# Patient Record
Sex: FEMALE | Race: WHITE | Employment: FULL TIME | URBAN - METROPOLITAN AREA
[De-identification: names, ages, dates, MRNs, and addresses within clinical notes are randomized per-mention and may not be internally consistent; named-entity substitution may affect disease eponyms.]

---

## 2017-10-03 ENCOUNTER — HOSPITAL ENCOUNTER (OUTPATIENT)
Dept: GENERAL RADIOLOGY | Age: 37
Discharge: HOME | End: 2017-10-03
Attending: FAMILY MEDICINE
Payer: COMMERCIAL

## 2017-10-03 DIAGNOSIS — M25.532 LEFT WRIST PAIN: ICD-10-CM

## 2017-10-03 PROCEDURE — 73100 X-RAY EXAM OF WRIST: CPT

## 2017-10-30 ENCOUNTER — HOSPITAL ENCOUNTER (OUTPATIENT)
Dept: PHYSICAL THERAPY | Age: 37
End: 2017-10-30

## 2017-11-03 ENCOUNTER — HOSPITAL ENCOUNTER (OUTPATIENT)
Dept: PHYSICAL THERAPY | Age: 37
Discharge: HOME | End: 2017-11-03
Payer: COMMERCIAL

## 2017-11-03 PROCEDURE — 97530 THERAPEUTIC ACTIVITIES: CPT

## 2017-11-03 PROCEDURE — 97165 OT EVAL LOW COMPLEX 30 MIN: CPT

## 2017-11-03 NOTE — PROGRESS NOTES
Ambulatory/Rehab Services H2 Model Falls Risk Assessment    Risk Factor Pts. ·   Confusion/Disorientation/Impulsivity  []    4 ·   Symptomatic Depression  []   2 ·   Altered Elimination  []   1 ·   Dizziness/Vertigo  []   1 ·   Gender (Male)  []   1 ·   Any administered antiepileptics (anticonvulsants):  []   2 ·   Any administered benzodiazepines:  []   1 ·   Visual Impairment (specify):  []   1 ·   Portable Oxygen Use  []   1 ·   Orthostatic ? BP  []   1 ·   History of Recent Falls (within 3 mos.)  []   5     Ability to Rise from Chair (choose one) Pts. ·   Ability to rise in a single movement  []   0 ·   Pushes up, successful in one attempt  []   1 ·   Multiple attempts, but successful  []   3 ·   Unable to rise without assistance  []   4   Total: (5 or greater = High Risk) 0     Falls Prevention Plan:   []                Physical Limitations to Exercise (specify):   []                Mobility Assistance Device (type):   []                Exercise/Equipment Adaptation (specify):    ©2010 [facility] [address] States Patent #[id number].  Federal Law prohibits the replication, distribution or use without written permission from [facility] [employer]

## 2017-11-03 NOTE — THERAPY EVALUATION
Natalie Devi  : 1980  Payor: Sanya Greene / Plan: Bev Toscano RPN / Product Type: Commerical /  [address]  at [facility]  [address], [address], [place]  [phone]   [phone]         OUTPATIENT OCCUPATIONAL THERAPY: Initial Assessment 11/3/2017     ICD-10: Treatment Diagnosis: M25.532 M62.642 left wrist and thumb pain and numbness  Precautions/Allergies:   Augmentin [amoxicillin-pot clavulanate] causes stomach upset  Fall Risk Score: 0 (? 5 = High Risk)  MD Orders: Evaluate and treat MEDICAL/REFERRING DIAGNOSIS:   Neuralgia and neuritis, unspecified [M79.2]  And G56.02 left carpal tunnel syndrome  DATE OF ONSET: May 2017   REFERRING PHYSICIAN: Eulalia Teague MD  RETURN PHYSICIAN APPOINTMENT: 17     INITIAL ASSESSMENT:  Ms. Helane Gaucher presents with numbness and burning pain about the left thumb, volar wrist and occasionally in th and 5th digits, which are limiting her daily ADl's, work task and school online/lwork. Ms Helane Gaucher will benefit from skilled Occupational Therapy towards resolution and improved functional ADL's, per POC below. PLAN OF CARE:   PROBLEM LIST:  1. Decreased Strength  2. Decreased ADL/Functional Activities  3. Increased Pain  4. Decreased Flexibility/Joint Mobility  5. Decreased Knowledge of Precautions  6. Decreased [place] with Home Exercise Program INTERVENTIONS PLANNED:  1. Manual therapy training  2. Modalities  3. Sensory reintegration training  4. Therapeutic activity  5. Therapeutic exercise  6. Patient education towards I HEP to resolve current symptoms   TREATMENT PLAN:  Effective Dates: 11/3/17 TO 17. Frequency/Duration: 2 times a week for 6 weeks  GOALS: (Goals have been discussed and agreed upon with patient.)  Short-Term Functional Goals: Time Frame: 3 weeks  1. Patient will demonstrate correct HEP exercises within 2 weeks of initial visit.   2. Patient will reduce pain to being 2/10 or less in 3 weeks.  3. Patient will reduce frequency of numbness to rare at end of 3 weeks, as able. 4. Patient will improve AROM by at least 10-15 degrees in left wrist, as needed for functional ADL's. Discharge Goals: Time Frame: 6 weeks  1. Patient will be I with correct HEP by d/c.  2. Patient will be pain-free and have no numbness, as able, for functional daily use by d/c for left hand. 3. Patient will improve AROM to WNL as needed for daily ADL's by d/c.     4.   Patient will demonstrate  strength within norms expected for age/sex by d/c, as able. 5. Quick DASH eveidence-based score will be 1-19 % or better by d/c.  Rehabilitation Potential For Stated Goals: Good  Regarding Cristel Orozco's therapy, I certify that the treatment plan above will be carried out by a therapist or under their direction. Thank you for this referral,  Romana Hoar, OT     Referring Physician Signature: Florecita Del Rio MD _________________________  Date _________       The information in this section was collected on 11/3/17 (except where otherwise noted). OCCUPATIONAL PROFILE & HISTORY:   History of Present Injury/Illness (Reason for Referral):  Sudden onset of left sharp hand and wrist pain extending up shoulder in May 2017 while working. Numbness followed in 4th , 5th and thumb areas and continues. Sought medical attention in May with OTC medication and again in September, when Ms. Brittany Mendes was placed in wrist splint and began Mobic. Past Medical History/Comorbidities:   Ms. Brittany Mendes  has a past medical history of Deviated nasal septum (2012). Ms. Brittany Mendes  has a past surgical history that includes wisdom teeth extraction (1997). Social History/Living Environment:    Lives in 2 -story house. Boyfriend and 15year old daughter live in house and are supportive. Prior Level of Function/Work/Activity:  Works FT as  at [employer] 6 years, and taking 3 college classes, 2 of 3 online.   Dominant Side:         RIGHT No previous therapy episodes for this problem. Current Medications:    Current Outpatient Prescriptions:     meloxicam (MOBIC) 15 mg tablet, Take 1 Tab by mouth daily. , Disp: 30 Tab, Rfl: 1    norethindrone-ethinyl estradiol (BALZIVA, 28,) 0.4-35 mg-mcg per tablet, Take 1 tablet by mouth daily. , Disp: , Rfl:             Date Last Reviewed: 11/3/2017   Complexity Level : Brief history (0):  LOW COMPLEXITY   ASSESSMENT OF OCCUPATIONAL PERFORMANCE:   Observation/Orthostatic Postural Assessment:          Ms Chuyita Hall is point tender along medial elbows, more painful on right then left noted. Ms Chuyita Hall reports constant ache, dull, burning numb, sharp, tingling, and twinge in left fingers and sometimes arm. Palpation:          + Phalens on left and + Tinnels on left wrist.    ROM:       LEFT        RIIGHT  Wrist ext    50             85  Wrist flexion 55          75   Comments: all fists and fingers AROM are WNL with normal opposition noted bilaterally. Strength:   strength RIGHT 65, 58, 78(67 av#)  LEFT 44, 50, 44(46 av#)       Norms for age/sex R should be 74 av# Left should be 66 av#  Comments: Left involved is 20 av # weaker than expected norms. Coordination: 9 Hole peg test        R completes 9 pegs in/out in 16 seconds/WNL and left completes in 20 seconds, 1 dropped  Norms for age/sex R should be 17 seconds and left should be 18 seconds to complete. Edema/Girth:  none    Left Right    Initial 11/3/17 Most Recent Initial 11/3/17 Most Recent   Upper  Extremity  DWC 13.8 cm  DPC 16.8 cm  Index PP 4.4 cm    DWC 13.7 cm   DPC 17.2 cm  Index PP 4.9 cm     Lower  Extremity              Activities of Daily Living:        Current limitations include cooking, driving, dropping items, severe difficulty with washing walls/tables, and moderate difficulty with opening containers, cutting food and is having significant tingling to numbness.   Basic ADLs (From Assessment) Complex ADLs (From Assessment) Grooming/Bathing/Dressing Activities of Daily Living                                   Sensation:       Numbness in thumb and volar wrist, involving 4th and 5th fingers. Patient demonstrates possible symptoms of cubital tunnel. Physical Skills Involved:  1. Range of Motion  2. Strength  3. Sensation  4. Fine Motor Control  5. Pain (acute) Cognitive Skills Affected (resulting in the inability to perform in a timely and safe manner):  1. Perception  2. Executive Function Psychosocial Skills Affected:  1. Habits/Routines  2. Social Interaction  3. Social Roles  4. decreased knowledge of precautions and how to resolve current limiations of ADL's. Number of elements that affect the Plan of Care: 3-5:  MODERATE COMPLEXITY   CLINICAL DECISION MAKING:   Outcome Measure: Tool Used: Disabilities of the Arm, Shoulder and Hand (DASH) Questionnaire - Quick Version  Score:  Initial: 35/55  Most Recent: X/55 (Date: -- )   Interpretation of Score: The DASH is designed to measure the activities of daily living in person's with upper extremity dysfunction or pain. Each section is scored on a 1-5 scale, 5 representing the greatest disability. The scores of each section are added together for a total score of 55. Score 11 12-19 20-28 29-37 38-45 46-54 55   Modifier CH CI CJ CK CL CM CN     ? Self Care:     - CURRENT STATUS: CK - 40%-59% impaired, limited or restricted    - GOAL STATUS: CI - 1%-19% impaired, limited or restricted    - D/C STATUS:  ---------------To be determined---------------          Medical Necessity:   · Patient demonstrates good rehab potential due to higher previous functional level. Clinical Decision-Making Assessment:  Patient has a questionable predication of outcome due to severity of symptoms and length of time since onset.    Use of outcome tool(s) and clinical judgement create a POC that gives a: Questionable prediction of patient's progress: MODERATE COMPLEXITY TREATMENT:   (In addition to Assessment/Re-Assessment sessions the following treatments were rendered)    Pre-treatment Symptoms/Complaints:  Pain 4/10 at initial evaluation, but typically 8/10. Pain: Initial: Pain Intensity 1: 2  Pain Location 1: Wrist, Finger (comment which one) (thumb and volar wrist)  Pain Orientation 1: Left  Post Session:  Unchanged today       1 low complexity evaluation and 2 therapeutic activites   Date:  11/3/17 Date:   Date:     Activity/Exercise Parameters Parameters Parameters   Patient education and HEP Issued 7 page out with demonstration and RD of CTS stretches and positioning. Treatment/Session Assessment:    · Response to Treatment:  Patient cooperative with education and testing; initiated HEP. · Compliance with Program/Exercises: Will assess as treatment progresses. · Recommendations/Intent for next treatment session: \"Next visit will focus on reduction in assistance provided\".   Total Treatment Duration:  OT Patient Time In/Time Out  Time In: 0900  Time Out: [address], OT

## 2017-11-09 ENCOUNTER — HOSPITAL ENCOUNTER (OUTPATIENT)
Dept: PHYSICAL THERAPY | Age: 37
Discharge: HOME | End: 2017-11-09
Attending: FAMILY MEDICINE
Payer: COMMERCIAL

## 2017-11-09 NOTE — PROGRESS NOTES
OUTPATIENT DAILY NOTE    NAME/AGE/GENDER: Benny Lovelace is a 40 y.o. female. DATE: 11/9/2017    Patient cancelled for appointment today due to work conflict. Will plan to follow up on next scheduled visit.     Jerson Schmidt, OT

## 2017-11-14 ENCOUNTER — HOSPITAL ENCOUNTER (OUTPATIENT)
Dept: PHYSICAL THERAPY | Age: 37
Discharge: HOME | End: 2017-11-14
Payer: COMMERCIAL

## 2017-11-14 PROCEDURE — 97110 THERAPEUTIC EXERCISES: CPT

## 2017-11-14 NOTE — PROGRESS NOTES
Donald Santamaria  : 1980  Payor: Ragini Fajardo / Plan: Soledad Anthony RPN / Product Type: Commerical /  [address]  at [address]  [address], [address], [place] [address]  [phone]   [phone]         OUTPATIENT OCCUPATIONAL THERAPY: Daily Note and progress note 2017     ICD-10: Treatment Diagnosis: M25.532 M62.642 left wrist and thumb pain and numbness  Precautions/Allergies:   Augmentin [amoxicillin-pot clavulanate] causes stomach upset  Fall Risk Score: 0 (? 5 = High Risk)  MD Orders: Evaluate and treat MEDICAL/REFERRING DIAGNOSIS:   Neuralgia and neuritis, unspecified [M79.2]  And G56.02 left carpal tunnel syndrome  DATE OF ONSET: May 2017   REFERRING PHYSICIAN: Leigh Correa MD  RETURN PHYSICIAN APPOINTMENT: 17   Current Summary: Ms Keny Lux has been seen for a total of 2 visits between 11/3/17 and 17. AROM has significantly improved on left wrist,  strength remains about the same, but volar wrist and thumb pain, as well as, numbness continues. Ms Keny Lux also reports 4th and 5th finger numbness. Patient is wearing a compressive glove and wrist splint much of the time. HEP was upgraded 17 with  and pinch strengthening. Patient sees MD on 17. INITIAL ASSESSMENT:  Ms. Keny Lux presents with numbness and burning pain about the left thumb, volar wrist and occasionally in th and 5th digits, which are limiting her daily ADl's, work task and school online/lwork. Ms Keny Lux will benefit from skilled Occupational Therapy towards resolution and improved functional ADL's, per POC below. PLAN OF CARE:   PROBLEM LIST:  1. Decreased Strength  2. Decreased ADL/Functional Activities  3. Increased Pain  4. Decreased Flexibility/Joint Mobility  5. Decreased Knowledge of Precautions  6. Decreased [place] with Home Exercise Program INTERVENTIONS PLANNED:  1. Manual therapy training  2. Modalities  3. Sensory reintegration training  4.  Therapeutic activity  5. Therapeutic exercise  6. Patient education towards I HEP to resolve current symptoms   TREATMENT PLAN:  Effective Dates: 11/3/17 TO 12/22/17. Frequency/Duration: 2 times a week for 6 weeks  GOALS: (Goals have been discussed and agreed upon with patient.)  Short-Term Functional Goals: Time Frame: 3 weeks  1. Patient will demonstrate correct HEP exercises within 2 weeks of initial visit. 2. Patient will reduce pain to being 2/10 or less in 3 weeks. 3. Patient will reduce frequency of numbness to rare at end of 3 weeks, as able. 4. Patient will improve AROM by at least 10-15 degrees in left wrist, as needed for functional ADL's. Discharge Goals: Time Frame: 6 weeks  1. Patient will be I with correct HEP by d/c.  2. Patient will be pain-free and have no numbness, as able, for functional daily use by d/c for left hand. 3. Patient will improve AROM to WNL as needed for daily ADL's by d/c.     4.   Patient will demonstrate  strength within norms expected for age/sex by d/c, as able. 5. Quick DASH eveidence-based score will be 1-19 % or better by d/c. The information in this section was collected on 11/3/17 (except where otherwise noted). OCCUPATIONAL PROFILE & HISTORY:   History of Present Injury/Illness (Reason for Referral):  Sudden onset of left sharp hand and wrist pain extending up shoulder in May 2017 while working. Numbness followed in 4th , 5th and thumb areas and continues. Sought medical attention in May with OTC medication and again in September, when Ms. Irvine Gaucher was placed in wrist splint and began Mobic. Past Medical History/Comorbidities:   Ms. Irvine Gaucher  has a past medical history of Deviated nasal septum (2012). Ms. Irvine Gaucher  has a past surgical history that includes wisdom teeth extraction (1997). Social History/Living Environment:    Lives in 2 -story house. Boyfriend and 15year old daughter live in house and are supportive.   Prior Level of Function/Work/Activity:  Works [employer] as  at [employer] 6 years, and taking 3 college classes, 2 of 3 online. Dominant Side:         RIGHT   No previous therapy episodes for this problem. Current Medications:    Current Outpatient Prescriptions:     meloxicam (MOBIC) 15 mg tablet, Take 1 Tab by mouth daily. , Disp: 30 Tab, Rfl: 1    norethindrone-ethinyl estradiol (BALZIVA, 28,) 0.4-35 mg-mcg per tablet, Take 1 tablet by mouth daily. , Disp: , Rfl:             Date Last Reviewed: 11/14/2017   Complexity Level : Brief history (0):  LOW COMPLEXITY   ASSESSMENT OF OCCUPATIONAL PERFORMANCE:   Observation/Orthostatic Postural Assessment:          Ms Camacho Body is point tender along medial elbows, more painful on right then left noted. Ms Camacho Body reports constant ache, dull, burning numb, sharp, tingling, and twinge in left fingers and sometimes arm. Palpation:          + Phalens on left and + Tinnels on left wrist.                         11/3/17                                 11/14/17  ROM:       LEFT        RIGHT                           LEFT  Wrist ext    50             85                                70         Wrist flexion 55           75                                 62   Comments: all fists and fingers AROM are WNL with normal opposition noted bilaterally. 11/3/17                                                             11/14/17      Strength:   strength RIGHT 65, 58, 78(67 av#)  LEFT 44, 50, 44(46 av#)        Right = 76, 75, 73( 75 av#)    Left = 45, 49, 50(48 av#)     Norms for age/sex R should be 74 av# Left should be 66 av#  Comments: Left involved is 20 av # weaker than expected norms. Coordination: 9 Hole peg test        R completes 9 pegs in/out in 16 seconds/WNL and left completes in 20 seconds, 1 dropped  Norms for age/sex R should be 17 seconds and left should be 18 seconds to complete.   Edema/Girth:  none    Left Right    Initial 11/3/17 Most Recent, 11/14/17 Initial 11/3/17 Most Recent   Upper  Extremity  DWC 13.8 cm  DPC 16.8 cm  Index PP 4.4 cm  13.5 cm  16.8 cm  4.9 cm  DWC 13.7 cm   DPC 17.2 cm  Index PP 4.9 cm     Lower  Extremity              Activities of Daily Living:        Current limitations include cooking, driving, dropping items, severe difficulty with washing walls/tables, and moderate difficulty with opening containers, cutting food and is having significant tingling to numbness. Basic ADLs (From Assessment) Complex ADLs (From Assessment)         Grooming/Bathing/Dressing Activities of Daily Living                                   Sensation:       Numbness in thumb and volar wrist, involving 4th and 5th fingers. Patient demonstrates possible symptoms of cubital tunnel. Physical Skills Involved:  1. Range of Motion  2. Strength  3. Sensation  4. Fine Motor Control  5. Pain (acute) Cognitive Skills Affected (resulting in the inability to perform in a timely and safe manner):  1. Perception  2. Executive Function Psychosocial Skills Affected:  1. Habits/Routines  2. Social Interaction  3. Social Roles  4. decreased knowledge of precautions and how to resolve current limiations of ADL's. Number of elements that affect the Plan of Care: 3-5:  MODERATE COMPLEXITY   CLINICAL DECISION MAKING:   Outcome Measure: Tool Used: Disabilities of the Arm, Shoulder and Hand (DASH) Questionnaire - Quick Version  Score:  Initial: 35/55  Most Recent: X/55 (Date: -- )   Interpretation of Score: The DASH is designed to measure the activities of daily living in person's with upper extremity dysfunction or pain. Each section is scored on a 1-5 scale, 5 representing the greatest disability. The scores of each section are added together for a total score of 55. Score 11 12-19 20-28 29-37 38-45 46-54 55   Modifier CH CI CJ CK CL CM CN     ?  Self Care:     - CURRENT STATUS: CK - 40%-59% impaired, limited or restricted    - GOAL STATUS: CI - 1%-19% impaired, limited or restricted    - D/C STATUS:  ---------------To be determined---------------          Medical Necessity:   · Patient demonstrates good rehab potential due to higher previous functional level. Clinical Decision-Making Assessment:  Patient has a questionable predication of outcome due to severity of symptoms and length of time since onset. Use of outcome tool(s) and clinical judgement create a POC that gives a: Questionable prediction of patient's progress: MODERATE COMPLEXITY   TREATMENT:   (In addition to Assessment/Re-Assessment sessions the following treatments were rendered)    Pre-treatment Symptoms/Complaints:  Pain 4/10 at initial evaluation, but typically 8/10. Pain: Initial: Pain Intensity 1: 2  Pain Location 1: Wrist, Finger (comment which one), Other (comment) (thumb and 4th,5th finger numbness and pain in volar wrist an)  Pain Orientation 1: Left  Post Session:  2/10       Self-care(0 minutes): functional smart goals developed to improve focus on functional ADL's. Therapeutic activities: (0 min) therapeutic review of HEP and tasks to improve pain management. Therapeutic exercises(45 min) to improve functional strength, AROM and use of bilateral hands and wrists. Manual( 0 min) to release soft tissue triggers to allow functional use for ADL's, without limitations of pain nor soft tissue restrictions. Modalities(0 min) to prepare joints and soft tissues for therapeutic functional tasks to improve ADL's. Date:  11/3/17 Date:  11/14/17 Date:     Activity/Exercise Parameters Parameters Parameters   Patient education and HEP Issued 7 page out with demonstration and RD of CTS stretches and positioning.  Retesting and upgraded HEP  Green medium resistive putty issued with , pinch and rolling exercises    Handhelper  50# , 2 sets of 15 X    Scarf exercises  3 sets    Putty exercises   Gripping X 2 min, rolling, pinching X 2 min with all digits Treatment/Session Assessment:  AROM improving, despite numbness continues. · Response to Treatment:  Patient cooperative with education and testing; initiated HEP. · Compliance with Program/Exercises: Will assess as treatment progresses. · Recommendations/Intent for next treatment session: \"Next visit will focus on reduction in assistance provided\".   Total Treatment Duration:  OT Patient Time In/Time Out  Time In: 0820  Time Out: 0900    Jose Kaufman OT   Future Appointments  Date Time Provider Ashley Escobar   11/16/2017 2:45 PM Gama Almanza OT [facility]   11/28/2017 8:15 AM Jose Kaufman OT [facility]   11/28/2017 4:30 PM Mani Perez MD [facility]   11/30/2017 8:00 AM Jose Kaufman OT [facility]   12/5/2017 8:00 AM Jose Kaufman OT [facility]   12/7/2017 8:00 AM Jose Kaufman OT [facility]   12/12/2017 8:00 AM Jose Kaufman OT [facility]   12/14/2017 8:00 AM Jose Kaufman OT [facility]   12/19/2017 8:00 AM Jose Kaufman OT [facility]   12/21/2017 8:00 AM Jose Kaufman OT [facility]   12/26/2017 8:00 AM Jose Kaufman OT [facility]   12/28/2017 8:00 AM Jose Kaufman OT [facility]

## 2017-11-16 ENCOUNTER — APPOINTMENT (OUTPATIENT)
Dept: PHYSICAL THERAPY | Age: 37
End: 2017-11-16
Payer: COMMERCIAL

## 2017-11-28 ENCOUNTER — APPOINTMENT (OUTPATIENT)
Dept: PHYSICAL THERAPY | Age: 37
End: 2017-11-28
Payer: COMMERCIAL

## 2017-11-30 ENCOUNTER — APPOINTMENT (OUTPATIENT)
Dept: PHYSICAL THERAPY | Age: 37
End: 2017-11-30
Payer: COMMERCIAL

## 2017-12-05 ENCOUNTER — APPOINTMENT (OUTPATIENT)
Dept: PHYSICAL THERAPY | Age: 37
End: 2017-12-05

## 2017-12-07 ENCOUNTER — APPOINTMENT (OUTPATIENT)
Dept: PHYSICAL THERAPY | Age: 37
End: 2017-12-07

## 2017-12-12 ENCOUNTER — APPOINTMENT (OUTPATIENT)
Dept: PHYSICAL THERAPY | Age: 37
End: 2017-12-12

## 2017-12-14 ENCOUNTER — APPOINTMENT (OUTPATIENT)
Dept: PHYSICAL THERAPY | Age: 37
End: 2017-12-14

## 2017-12-19 ENCOUNTER — APPOINTMENT (OUTPATIENT)
Dept: PHYSICAL THERAPY | Age: 37
End: 2017-12-19

## 2017-12-21 ENCOUNTER — APPOINTMENT (OUTPATIENT)
Dept: PHYSICAL THERAPY | Age: 37
End: 2017-12-21

## 2017-12-26 ENCOUNTER — APPOINTMENT (OUTPATIENT)
Dept: PHYSICAL THERAPY | Age: 37
End: 2017-12-26

## 2017-12-28 ENCOUNTER — APPOINTMENT (OUTPATIENT)
Dept: PHYSICAL THERAPY | Age: 37
End: 2017-12-28

## 2018-01-08 NOTE — PROGRESS NOTES
Jaky Smith  : 1980  Payor: Katherine Cantrell / Plan: Tammy Roa RPN / Product Type: Commerical /  [address]  at [facility]  [address], [address], [place]  [phone]   [phone]         OUTPATIENT OCCUPATIONAL THERAPY: Discontinuation Note  2018     ICD-10: Treatment Diagnosis: M25.532 M62.642 left wrist and thumb pain and numbness  Precautions/Allergies:   Augmentin [amoxicillin-pot clavulanate] causes stomach upset  Fall Risk Score: 0 (? 5 = High Risk)  MD Orders: Evaluate and treat MEDICAL/REFERRING DIAGNOSIS:   Neuralgia and neuritis, unspecified [M79.2]  And G56.02 left carpal tunnel syndrome  DATE OF ONSET: May 2017   REFERRING PHYSICIAN: Karely Plasencia MD  RETURN PHYSICIAN APPOINTMENT: 17   Current Summary: Ms Lottie Mccarty has been seen for a total of 2 visits between 11/3/17 and 17. AROM has significantly improved on left wrist,  strength remains about the same, but volar wrist and thumb pain, as well as, numbness continues. Ms Lottie Mccarty also reports 4th and 5th finger numbness. Patient is wearing a compressive glove and wrist splint much of the time. HEP was upgraded 17 with  and pinch strengthening. Patient sees MD on 17. INITIAL ASSESSMENT:  Ms. Lottie Mccarty presents with numbness and burning pain about the left thumb, volar wrist and occasionally in th and 5th digits, which are limiting her daily ADl's, work task and school online/lwork. Ms Lottie Mccarty will benefit from skilled Occupational Therapy towards resolution and improved functional ADL's, per POC below. PLAN OF CARE:   PROBLEM LIST:  1. Decreased Strength  2. Decreased ADL/Functional Activities  3. Increased Pain  4. Decreased Flexibility/Joint Mobility  5. Decreased Knowledge of Precautions  6. Decreased [place] with Home Exercise Program INTERVENTIONS PLANNED:  1. Manual therapy training  2. Modalities  3. Sensory reintegration training  4.  Therapeutic activity  5. Therapeutic exercise  6. Patient education towards I HEP to resolve current symptoms   TREATMENT PLAN:  Effective Dates: 11/3/17 TO 12/22/17. Frequency/Duration: 2 times a week for 6 weeks  GOALS: (Goals have been discussed and agreed upon with patient.)  Short-Term Functional Goals: Time Frame: 3 weeks  1. Patient will demonstrate correct HEP exercises within 2 weeks of initial visit. 2. Patient will reduce pain to being 2/10 or less in 3 weeks. 3. Patient will reduce frequency of numbness to rare at end of 3 weeks, as able. 4. Patient will improve AROM by at least 10-15 degrees in left wrist, as needed for functional ADL's. Discharge Goals: Time Frame: 6 weeks  1. Patient will be I with correct HEP by d/c.  2. Patient will be pain-free and have no numbness, as able, for functional daily use by d/c for left hand. 3. Patient will improve AROM to WNL as needed for daily ADL's by d/c.     4.   Patient will demonstrate  strength within norms expected for age/sex by d/c, as able. 5. Quick DASH eveidence-based score will be 1-19 % or better by d/c. The information in this section was collected on 11/3/17 (except where otherwise noted). OCCUPATIONAL PROFILE & HISTORY:   History of Present Injury/Illness (Reason for Referral):  Sudden onset of left sharp hand and wrist pain extending up shoulder in May 2017 while working. Numbness followed in 4th , 5th and thumb areas and continues. Sought medical attention in May with OTC medication and again in September, when Ms. Wes Armstrong was placed in wrist splint and began Mobic. Past Medical History/Comorbidities:   Ms. Wes Armstrong  has a past medical history of Deviated nasal septum (2012). Ms. Wes Armstrong  has a past surgical history that includes hx wisdom teeth extraction (1997). Social History/Living Environment:    Lives in 2 -story house. Boyfriend and 15year old daughter live in house and are supportive.   Prior Level of Function/Work/Activity:  Works [employer] as  at [employer] X 6 years, and taking 3 college classes, 2 of 3 online. Dominant Side:         RIGHT   No previous therapy episodes for this problem. Current Medications:    Current Outpatient Prescriptions:     meloxicam (MOBIC) 15 mg tablet, TAKE 1 TABLET BY MOUTH EVERY DAY, Disp: 30 Tab, Rfl: 0    norethindrone-ethinyl estradiol (BALZIVA, 28,) 0.4-35 mg-mcg per tablet, Take 1 tablet by mouth daily. , Disp: , Rfl:             Date Last Reviewed: 11/14/2017   Complexity Level : Brief history (0):  LOW COMPLEXITY   ASSESSMENT OF OCCUPATIONAL PERFORMANCE:   Observation/Orthostatic Postural Assessment:          Ms Kimberlee Yin is point tender along medial elbows, more painful on right then left noted. Ms Kimberlee Yin reports constant ache, dull, burning numb, sharp, tingling, and twinge in left fingers and sometimes arm. Palpation:          + Phalens on left and + Tinnels on left wrist.                         11/3/17                                 11/14/17  ROM:       LEFT        RIGHT                           LEFT  Wrist ext    50             85                                70         Wrist flexion 55           75                                 62   Comments: all fists and fingers AROM are WNL with normal opposition noted bilaterally. 11/3/17                                                             11/14/17      Strength:   strength RIGHT 65, 58, 78(67 av#)  LEFT 44, 50, 44(46 av#)        Right = 76, 75, 73( 75 av#)    Left = 45, 49, 50(48 av#)     Norms for age/sex R should be 74 av# Left should be 66 av#  Comments: Left involved is 20 av # weaker than expected norms. Coordination: 9 Hole peg test        R completes 9 pegs in/out in 16 seconds/WNL and left completes in 20 seconds, 1 dropped  Norms for age/sex R should be 17 seconds and left should be 18 seconds to complete.   Edema/Girth:  none    Left Right    Initial 11/3/17 Most Recent, 11/14/17 Initial 11/3/17 Most Recent   Upper  Extremity  DWC 13.8 cm  DPC 16.8 cm  Index PP 4.4 cm  13.5 cm  16.8 cm  4.9 cm  DWC 13.7 cm   DPC 17.2 cm  Index PP 4.9 cm     Lower  Extremity              Activities of Daily Living:        Current limitations include cooking, driving, dropping items, severe difficulty with washing walls/tables, and moderate difficulty with opening containers, cutting food and is having significant tingling to numbness. Basic ADLs (From Assessment) Complex ADLs (From Assessment)         Grooming/Bathing/Dressing Activities of Daily Living                                   Sensation:       Numbness in thumb and volar wrist, involving 4th and 5th fingers. Patient demonstrates possible symptoms of cubital tunnel. Physical Skills Involved:  1. Range of Motion  2. Strength  3. Sensation  4. Fine Motor Control  5. Pain (acute) Cognitive Skills Affected (resulting in the inability to perform in a timely and safe manner):  1. Perception  2. Executive Function Psychosocial Skills Affected:  1. Habits/Routines  2. Social Interaction  3. Social Roles  4. decreased knowledge of precautions and how to resolve current limiations of ADL's. Number of elements that affect the Plan of Care: 3-5:  MODERATE COMPLEXITY   CLINICAL DECISION MAKING:   Outcome Measure: Tool Used: Disabilities of the Arm, Shoulder and Hand (DASH) Questionnaire - Quick Version  Score:  Initial: 35/55  Most Recent: X/55 (Date: -- )   Interpretation of Score: The DASH is designed to measure the activities of daily living in person's with upper extremity dysfunction or pain. Each section is scored on a 1-5 scale, 5 representing the greatest disability. The scores of each section are added together for a total score of 55. Score 11 12-19 20-28 29-37 38-45 46-54 55   Modifier CH CI CJ CK CL CM CN     ?  Self Care:     - CURRENT STATUS: CK - 40%-59% impaired, limited or restricted    - GOAL STATUS: CI - 1%-19% impaired, limited or restricted    - D/C STATUS:  ---------------To be determined---------------          Medical Necessity:   · Patient demonstrates good rehab potential due to higher previous functional level. Clinical Decision-Making Assessment:  Patient has a questionable predication of outcome due to severity of symptoms and length of time since onset. Use of outcome tool(s) and clinical judgement create a POC that gives a: Questionable prediction of patient's progress: MODERATE COMPLEXITY   TREATMENT:   (In addition to Assessment/Re-Assessment sessions the following treatments were rendered)    Pre-treatment Symptoms/Complaints:  Pain 4/10 at initial evaluation, but typically 8/10. Pain: Initial: Pain Intensity 1: 2  Pain Location 1: Wrist, Finger (comment which one), Other (comment) (thumb and 4th,5th finger numbness and pain in volar wrist an)  Pain Orientation 1: Left  Post Session:  2/10       Self-care(0 minutes): functional smart goals developed to improve focus on functional ADL's. Therapeutic activities: (0 min) therapeutic review of HEP and tasks to improve pain management. Therapeutic exercises(45 min) to improve functional strength, AROM and use of bilateral hands and wrists. Manual( 0 min) to release soft tissue triggers to allow functional use for ADL's, without limitations of pain nor soft tissue restrictions. Modalities(0 min) to prepare joints and soft tissues for therapeutic functional tasks to improve ADL's. Date:  11/3/17 Date:  11/14/17 Date:     Activity/Exercise Parameters Parameters Parameters   Patient education and HEP Issued 7 page out with demonstration and RD of CTS stretches and positioning.  Retesting and upgraded HEP  Green medium resistive putty issued with , pinch and rolling exercises    Handhelper  50# , 2 sets of 15 X    Scarf exercises  3 sets    Putty exercises   Gripping X 2 min, rolling, pinching X 2 min with all digits Treatment/Session Assessment:  AROM improving, despite numbness continues. · Response to Treatment:  Patient cooperative with education and testing; initiated HEP. · Compliance with Program/Exercises: Will assess as treatment progresses. · Recommendations/Intent for next treatment session: \"Next visit will focus on reduction in assistance provided\". Iris Oreilly, OT   No future appointments.